# Patient Record
Sex: FEMALE | Race: WHITE | NOT HISPANIC OR LATINO | Employment: OTHER | URBAN - METROPOLITAN AREA
[De-identification: names, ages, dates, MRNs, and addresses within clinical notes are randomized per-mention and may not be internally consistent; named-entity substitution may affect disease eponyms.]

---

## 2017-02-22 ENCOUNTER — RESULTS ENCOUNTER (OUTPATIENT)
Dept: INTERNAL MEDICINE | Facility: CLINIC | Age: 67
End: 2017-02-22

## 2017-02-22 DIAGNOSIS — E78.5 HYPERLIPIDEMIA: ICD-10-CM

## 2017-02-22 DIAGNOSIS — D72.819 LEUKOPENIA: ICD-10-CM

## 2017-02-22 DIAGNOSIS — I10 ESSENTIAL HYPERTENSION: ICD-10-CM

## 2021-03-09 DIAGNOSIS — Z23 IMMUNIZATION DUE: ICD-10-CM

## 2023-02-07 ENCOUNTER — OFFICE VISIT (OUTPATIENT)
Dept: GASTROENTEROLOGY | Facility: CLINIC | Age: 73
End: 2023-02-07
Payer: MEDICARE

## 2023-02-07 VITALS
WEIGHT: 203.9 LBS | SYSTOLIC BLOOD PRESSURE: 115 MMHG | OXYGEN SATURATION: 98 % | TEMPERATURE: 97.3 F | DIASTOLIC BLOOD PRESSURE: 75 MMHG | HEART RATE: 79 BPM | BODY MASS INDEX: 32.77 KG/M2 | HEIGHT: 66 IN

## 2023-02-07 DIAGNOSIS — R10.30 LOWER ABDOMINAL PAIN: ICD-10-CM

## 2023-02-07 DIAGNOSIS — K21.9 GASTROESOPHAGEAL REFLUX DISEASE, UNSPECIFIED WHETHER ESOPHAGITIS PRESENT: Primary | ICD-10-CM

## 2023-02-07 PROCEDURE — 99204 OFFICE O/P NEW MOD 45 MIN: CPT | Performed by: INTERNAL MEDICINE

## 2023-02-07 RX ORDER — OMEPRAZOLE 20 MG/1
CAPSULE, DELAYED RELEASE ORAL
COMMUNITY
Start: 2023-01-22

## 2023-02-07 RX ORDER — ACETAMINOPHEN 500 MG
500 TABLET ORAL
COMMUNITY

## 2023-02-07 RX ORDER — SODIUM CHLORIDE, SODIUM LACTATE, POTASSIUM CHLORIDE, CALCIUM CHLORIDE 600; 310; 30; 20 MG/100ML; MG/100ML; MG/100ML; MG/100ML
30 INJECTION, SOLUTION INTRAVENOUS CONTINUOUS
Status: CANCELLED | OUTPATIENT
Start: 2023-04-13

## 2023-02-07 RX ORDER — FOLIC ACID/MULTIVIT,IRON,MINER 0.4MG-18MG
TABLET ORAL
COMMUNITY

## 2023-02-07 RX ORDER — METRONIDAZOLE 500 MG/1
TABLET ORAL
COMMUNITY
Start: 2023-01-05 | End: 2023-02-07

## 2023-02-07 RX ORDER — LEVOFLOXACIN 750 MG/1
TABLET ORAL
COMMUNITY
Start: 2023-01-05 | End: 2023-02-07

## 2023-02-07 RX ORDER — ROSUVASTATIN CALCIUM 20 MG/1
20 TABLET, COATED ORAL DAILY
COMMUNITY
Start: 2023-01-25 | End: 2024-01-25

## 2023-02-07 NOTE — PROGRESS NOTES
Chief Complaint   Patient presents with   • Diverticulitis     Subjective   HPI  Lorenzo Rooney is a 73 y.o. female who presents today for new patient evaluation.    Intermittent episodes of lower abdominal pain.  Bloating.  Treated with course of flagyl by her daughter (ID NP) for presumed diverticulitis with some improvement.    ? Episode of esophageal spasm few years ago.  On PPI for GERD with improvement.      CT angiogram abdomen 2020 reviewed, scattered L sided diverticulosis  Colonoscopy 2016 - 5mm TA in descending colon  Cologuard 2021 - negative      Objective   Vitals:    02/07/23 1312   BP: 115/75   Pulse: 79   Temp: 97.3 °F (36.3 °C)   SpO2: 98%     Physical Exam  Vitals reviewed.   Constitutional:       Appearance: She is well-developed.   HENT:      Head: Normocephalic and atraumatic.   Neurological:      Mental Status: She is alert and oriented to person, place, and time.   Psychiatric:         Behavior: Behavior normal.         Thought Content: Thought content normal.         Judgment: Judgment normal.              Assessment & Plan   Assessment:     1. Gastroesophageal reflux disease, unspecified whether esophagitis present    2. Lower abdominal pain    3.      Personal hx of colon polyps    Plan:   Schedule EGD/colonoscopy  Continue daily PPI  Fiber supplement          Sylvester Mcintosh M.D.  [facility]  [address] [place] [address]  Office: [phone]

## 2023-02-13 ENCOUNTER — TELEPHONE (OUTPATIENT)
Dept: GASTROENTEROLOGY | Facility: CLINIC | Age: 73
End: 2023-02-13

## 2023-02-22 ENCOUNTER — TELEPHONE (OUTPATIENT)
Dept: GASTROENTEROLOGY | Facility: CLINIC | Age: 73
End: 2023-02-22

## 2023-02-22 NOTE — TELEPHONE ENCOUNTER
Hub staff attempted to follow warm transfer process and was unsuccessful     Caller: Lorenzo Rooney    Relationship to patient: Self    Best call back number: [phone]    Patient is needing: RESCHEDULE EGD/COLONOSCOPY SCHEDULED FOR 4/13/23. PT NEEDS TO MOVE TO [place] IF POSSIBLE. [place] OF [place]OWN.

## 2023-05-04 ENCOUNTER — TELEPHONE (OUTPATIENT)
Dept: GASTROENTEROLOGY | Facility: CLINIC | Age: 73
End: 2023-05-04
Payer: MEDICARE

## 2023-05-05 ENCOUNTER — TELEPHONE (OUTPATIENT)
Dept: GASTROENTEROLOGY | Facility: CLINIC | Age: 73
End: 2023-05-05
Payer: MEDICARE

## 2023-05-05 NOTE — TELEPHONE ENCOUNTER
Caller: Lorenzo Rooney    Relationship to patient: Self    Best call back number: [phone]    Chief complaint: PATIENT TESTED POSITIVE WITH COVID ON 05/01/23 AND ISN'T FEELING WELL. SHE DOESN'T WANT TO PUT HER BODY THROUGH THE PREP AFTER RECOVERING FROM COVID, SO SHE WOULD LIKE TO RESCHEDULE. UNABLE TO WARM TRANSFER. HER SCOPE IS ON 05/17/23 AND WOULD LIKE TO PUSH IT OUT.     Type of visit: COLONOSCOPY    If rescheduling, when is the original appointment: 05/17/23

## 2023-05-10 NOTE — TELEPHONE ENCOUNTER
Hub staff attempted to follow warm transfer process and was unsuccessful     Caller: Lorenzo Rooney    Relationship to patient: Self    Best call back number: [phone]    Patient is needing: PT IS TRYING TO RESCHEDULE COLONOSCOPY. STILL HAS NOT HEARD ANYTHING BACK. PLEASE CALL PATIENT BACK ASAP TO SCHEDULE.

## 2023-05-19 ENCOUNTER — TELEPHONE (OUTPATIENT)
Dept: GASTROENTEROLOGY | Facility: CLINIC | Age: 73
End: 2023-05-19

## 2023-05-19 NOTE — TELEPHONE ENCOUNTER
Caller: Lorenzo Rooney    Relationship: Self    Best call back number: [phone]    What is the best time to reach you: ANYTIME    Who are you requesting to speak with (clinical staff, provider,  specific staff member): CLINICAL STAFF/    What was the call regarding: PT STATES HER PROC DATE IS INCORRECT/PLEASE CALL AND CONFIRM ACTUAL DATE AND TIME    Do you require a callback: YES

## 2023-06-01 ENCOUNTER — TELEPHONE (OUTPATIENT)
Dept: GASTROENTEROLOGY | Facility: CLINIC | Age: 73
End: 2023-06-01

## 2023-06-01 NOTE — TELEPHONE ENCOUNTER
Caller: Lorenzo Rooney    Relationship to patient: Self    Best call back number: [phone]    Patient is needing: PT WOULD LIKE TO CANCEL SCOPE FOR 7/24/23. PT DOES NOT WANT TO R/S.